# Patient Record
Sex: FEMALE | Race: WHITE | NOT HISPANIC OR LATINO | Employment: UNEMPLOYED | URBAN - NONMETROPOLITAN AREA
[De-identification: names, ages, dates, MRNs, and addresses within clinical notes are randomized per-mention and may not be internally consistent; named-entity substitution may affect disease eponyms.]

---

## 2017-02-28 ENCOUNTER — TELEPHONE (OUTPATIENT)
Dept: INTERNAL MEDICINE | Facility: CLINIC | Age: 42
End: 2017-02-28

## 2017-02-28 NOTE — TELEPHONE ENCOUNTER
----- Message from Ambar Baxter sent at 2/28/2017  3:42 PM EST -----  PATIENT CALLED AND WANTED TO TALK TO SOMEONE ABOUT HER HEALTH.     SHE HAS COME OFF ALL OF HER MEDICATION. SHE IS WANTING TO TAKE A NATURAL PATH WITH HER HEALTH CARE.     SHE IS ALSO NEEDING HER PET FOR HER EMOTIONAL SUPPORT AND NEEDS PAPERWORK FOR THAT.     SHE STATED THAT FOR THE LAST 2 YEARS SHE HAS BEEN WORKING ON HER DIET AND IS EATING ALMOST AN ALL NATURAL DIET.     I SCHEDULED HER FOR AN APPOINTMENT BUT SHE DIDN'T KNOW IF SHE WOULD NEED IT OR NOT. SHE DID WANT DR VERMEESCH TO ADVISE HER ON THESE THINGS. PLEASE CALL AND LET HER KNOW IF SHE NEEDS THAT APPOINTMENT.     [phone]

## 2017-02-28 NOTE — TELEPHONE ENCOUNTER
Left Pt detailed VM that she will need to be seen for this as Dr. Vermeesch hasn't seen her since 2015

## 2017-03-03 PROBLEM — F43.10 PTSD (POST-TRAUMATIC STRESS DISORDER): Status: ACTIVE | Noted: 2017-03-03

## 2018-02-06 ENCOUNTER — OFFICE VISIT (OUTPATIENT)
Dept: INTERNAL MEDICINE | Facility: CLINIC | Age: 43
End: 2018-02-06

## 2018-02-06 VITALS
SYSTOLIC BLOOD PRESSURE: 120 MMHG | TEMPERATURE: 98 F | DIASTOLIC BLOOD PRESSURE: 80 MMHG | OXYGEN SATURATION: 98 % | HEART RATE: 73 BPM

## 2018-02-06 DIAGNOSIS — F32.A ANXIETY AND DEPRESSION: Primary | ICD-10-CM

## 2018-02-06 DIAGNOSIS — F41.9 ANXIETY AND DEPRESSION: Primary | ICD-10-CM

## 2018-02-06 DIAGNOSIS — L65.9 HAIR LOSS: ICD-10-CM

## 2018-02-06 DIAGNOSIS — N30.00 ACUTE CYSTITIS WITHOUT HEMATURIA: ICD-10-CM

## 2018-02-06 LAB
BILIRUB BLD-MCNC: NEGATIVE MG/DL
CLARITY, POC: CLEAR
COLOR UR: YELLOW
GLUCOSE UR STRIP-MCNC: NEGATIVE MG/DL
KETONES UR QL: NEGATIVE
LEUKOCYTE EST, POC: ABNORMAL
NITRITE UR-MCNC: NEGATIVE MG/ML
PH UR: 6 [PH] (ref 5–8)
PROT UR STRIP-MCNC: NEGATIVE MG/DL
RBC # UR STRIP: ABNORMAL /UL
SP GR UR: 1.01 (ref 1–1.03)
T4 FREE SERPL-MCNC: 1.23 NG/DL (ref 0.78–2.19)
TSH SERPL DL<=0.005 MIU/L-ACNC: 0.86 MIU/ML (ref 0.47–4.68)
UROBILINOGEN UR QL: NORMAL

## 2018-02-06 PROCEDURE — 81003 URINALYSIS AUTO W/O SCOPE: CPT | Performed by: INTERNAL MEDICINE

## 2018-02-06 PROCEDURE — 99214 OFFICE O/P EST MOD 30 MIN: CPT | Performed by: INTERNAL MEDICINE

## 2018-02-06 RX ORDER — HYDROXYZINE HYDROCHLORIDE 25 MG/1
TABLET, FILM COATED ORAL
COMMUNITY
Start: 2014-08-11

## 2018-02-06 RX ORDER — SULFAMETHOXAZOLE AND TRIMETHOPRIM 800; 160 MG/1; MG/1
1 TABLET ORAL 2 TIMES DAILY
Qty: 14 TABLET | Refills: 0 | Status: SHIPPED | OUTPATIENT
Start: 2018-02-06 | End: 2018-03-16

## 2018-02-06 RX ORDER — CLONAZEPAM 0.5 MG/1
0.5 TABLET ORAL 2 TIMES DAILY PRN
Qty: 60 TABLET | Refills: 0 | Status: SHIPPED | OUTPATIENT
Start: 2018-02-06 | End: 2018-05-21 | Stop reason: SDUPTHER

## 2018-02-06 RX ORDER — RISPERIDONE 0.25 MG/1
TABLET ORAL
COMMUNITY
Start: 2015-04-08 | End: 2018-03-16 | Stop reason: SDUPTHER
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
## 2018-02-07 ENCOUNTER — TELEPHONE (OUTPATIENT)
Dept: INTERNAL MEDICINE | Facility: CLINIC | Age: 43
End: 2018-02-07

## 2018-02-07 NOTE — TELEPHONE ENCOUNTER
----- Message from Marilyn K Vermeesch, MD sent at 2/6/2018  4:46 PM EST -----  Please tell patient her thyroid labs are in normal range.

## 2018-02-08 LAB
BACTERIA UR CULT: ABNORMAL
BACTERIA UR CULT: ABNORMAL
OTHER ANTIBIOTIC SUSC ISLT: ABNORMAL

## 2018-02-21 ENCOUNTER — TELEPHONE (OUTPATIENT)
Dept: INTERNAL MEDICINE | Facility: CLINIC | Age: 43
End: 2018-02-21

## 2018-02-21 NOTE — TELEPHONE ENCOUNTER
"I called patient to discuss a referral. While we were discussing this, she asked if her thyroid results were \"optimal\". She states she did receive the  advising her that the results were normal, however, she is requesting a call back to let her know how normal. Thank you   "

## 2018-03-16 ENCOUNTER — OFFICE VISIT (OUTPATIENT)
Dept: INTERNAL MEDICINE | Facility: CLINIC | Age: 43
End: 2018-03-16

## 2018-03-16 VITALS
TEMPERATURE: 98 F | WEIGHT: 154 LBS | DIASTOLIC BLOOD PRESSURE: 76 MMHG | BODY MASS INDEX: 29.1 KG/M2 | OXYGEN SATURATION: 98 % | HEART RATE: 98 BPM | SYSTOLIC BLOOD PRESSURE: 124 MMHG

## 2018-03-16 DIAGNOSIS — F43.10 PTSD (POST-TRAUMATIC STRESS DISORDER): ICD-10-CM

## 2018-03-16 DIAGNOSIS — F41.9 ANXIETY AND DEPRESSION: Primary | ICD-10-CM

## 2018-03-16 DIAGNOSIS — F32.A ANXIETY AND DEPRESSION: Primary | ICD-10-CM

## 2018-03-16 PROBLEM — N30.00 ACUTE CYSTITIS WITHOUT HEMATURIA: Status: RESOLVED | Noted: 2018-02-06 | Resolved: 2018-03-16

## 2018-03-16 PROCEDURE — 99213 OFFICE O/P EST LOW 20 MIN: CPT | Performed by: INTERNAL MEDICINE

## 2018-03-16 RX ORDER — RISPERIDONE 0.25 MG/1
0.25 TABLET ORAL 2 TIMES DAILY PRN
Qty: 60 TABLET | Refills: 2 | Status: SHIPPED | OUTPATIENT
Start: 2018-03-16 | End: 2018-05-21 | Stop reason: SDUPTHER

## 2018-03-16 RX ORDER — LAMOTRIGINE 25 MG/1
25 TABLET ORAL 2 TIMES DAILY
Qty: 60 TABLET | Refills: 1 | Status: SHIPPED | OUTPATIENT
Start: 2018-03-16 | End: 2018-05-21

## 2018-03-16 NOTE — PROGRESS NOTES
Chief Complaint   Patient presents with   • Follow-up     4 weeks for anxiety. Pt states Clonazepam doesn't help her. Pt states she has some trouble sleeping.     Subjective   Yarelis Weiss is a 42 y.o. female.     Pt here for follow up of anxiety.  Last visit she was given klonapin to help with anxiety.  She states this is not helping her.  Has been taking about 1 tab or 1.5 tabs a day.  She did not want to take a daily medication, just something prn. She states she takes meds about 3 days and then it no longer works.    Her  has gabapentin and she took some of his and states it was helpful.  She took it during the day and states it pulls a weight off of her she is relaxed and she can feel normal.   She has not been sleeping well.  Currently does not eat well.    She was also given order for counseling.  She will make appt with Dr Padilla.         The following portions of the patient's history were reviewed and updated as appropriate: allergies, current medications, past family history, past medical history, past social history, past surgical history and problem list.    Review of Systems   Constitutional:        Poor appetite   Psychiatric/Behavioral: Positive for agitation, dysphoric mood and sleep disturbance. Negative for suicidal ideas. The patient is nervous/anxious.    All other systems reviewed and are negative.      Objective   /76   Pulse 98   Temp 98 °F (36.7 °C)   Wt 69.9 kg (154 lb)   SpO2 98%   BMI 29.10 kg/m²   Body mass index is 29.1 kg/m².  Physical Exam   Constitutional: She is oriented to person, place, and time. She appears well-developed and well-nourished.   Pleasant female, comes to clinic with her support dog   HENT:   Head: Normocephalic and atraumatic.   Eyes: Pupils are equal, round, and reactive to light.   Pulmonary/Chest: Effort normal.   Neurological: She is alert and oriented to person, place, and time.   Psychiatric: Her behavior is normal. Judgment and thought  content normal.   Sad mood, tearful at times, anxious   Nursing note and vitals reviewed.      Assessment/Plan   Yarelis Mortonmaryan is here today and the following problems have been addressed:      Yarelis was seen today for follow-up.    Diagnoses and all orders for this visit:    Anxiety and depression    PTSD (post-traumatic stress disorder)    Other orders  -     lamoTRIgine (LaMICtal) 25 MG tablet; Take 1 tablet by mouth 2 (Two) Times a Day.  -     risperiDONE (risperDAL) 0.25 MG tablet; Take 1 tablet by mouth 2 (Two) Times a Day As Needed (anxiety).    given lamictal 25 mg BID-take 25 mg daily at bedtime for 1 week, then increase to twice a day dosing   Also given risperdal .25 mg BID prn for sleep/anxiety  She will make appt with Dr Paidlla  Encouragement and reassurance given to patient    RTC 6 weeks    Please note that portions of this note were completed with a voice recognition program.  Efforts were made to edit dictation, but occasionally words are mistranscribed.

## 2018-05-21 ENCOUNTER — OFFICE VISIT (OUTPATIENT)
Dept: INTERNAL MEDICINE | Facility: CLINIC | Age: 43
End: 2018-05-21

## 2018-05-21 ENCOUNTER — TELEPHONE (OUTPATIENT)
Dept: INTERNAL MEDICINE | Facility: CLINIC | Age: 43
End: 2018-05-21

## 2018-05-21 VITALS
HEART RATE: 98 BPM | WEIGHT: 166 LBS | BODY MASS INDEX: 31.37 KG/M2 | SYSTOLIC BLOOD PRESSURE: 134 MMHG | OXYGEN SATURATION: 98 % | DIASTOLIC BLOOD PRESSURE: 72 MMHG | TEMPERATURE: 97.4 F

## 2018-05-21 DIAGNOSIS — F43.10 PTSD (POST-TRAUMATIC STRESS DISORDER): Primary | ICD-10-CM

## 2018-05-21 DIAGNOSIS — F32.A ANXIETY AND DEPRESSION: ICD-10-CM

## 2018-05-21 DIAGNOSIS — F41.9 ANXIETY AND DEPRESSION: ICD-10-CM

## 2018-05-21 PROCEDURE — 99214 OFFICE O/P EST MOD 30 MIN: CPT | Performed by: INTERNAL MEDICINE

## 2018-05-21 RX ORDER — RISPERIDONE 0.25 MG/1
0.25 TABLET ORAL 3 TIMES DAILY PRN
Qty: 90 TABLET | Refills: 5 | Status: SHIPPED | OUTPATIENT
Start: 2018-05-21

## 2018-05-21 RX ORDER — LAMOTRIGINE 50 MG/1
50 TABLET, ORALLY DISINTEGRATING ORAL 2 TIMES DAILY
Qty: 60 TABLET | Refills: 5 | Status: SHIPPED | OUTPATIENT
Start: 2018-05-21 | End: 2018-05-22 | Stop reason: CLARIF

## 2018-05-21 RX ORDER — BUSPIRONE HYDROCHLORIDE 30 MG/1
30 TABLET ORAL 3 TIMES DAILY PRN
Qty: 30 TABLET | Refills: 3 | Status: SHIPPED | OUTPATIENT
Start: 2018-05-21

## 2018-05-21 RX ORDER — CLONAZEPAM 1 MG/1
TABLET ORAL
Qty: 45 TABLET | Refills: 2 | Status: SHIPPED | OUTPATIENT
Start: 2018-05-21
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
## 2018-05-22 RX ORDER — LAMOTRIGINE 100 MG/1
50 TABLET ORAL 2 TIMES DAILY
Qty: 30 TABLET | Refills: 5 | Status: SHIPPED | OUTPATIENT
Start: 2018-05-22

## 2018-05-22 NOTE — TELEPHONE ENCOUNTER
You may call in Lamictal 50 mg tablet twice a day if available, if not, call in 100 mg tablet, one half tablet a.m. and p.m.  Please call in 1 month supply with 5 refills.

## 2018-12-08 NOTE — TELEPHONE ENCOUNTER
Last visit in office was 05/21/18. I also do not see a LLOYD or SVITLANAS on file. Ran the LLOYD. Pt will need an appt for refills correct?

## 2018-12-10 RX ORDER — CLONAZEPAM 1 MG/1
TABLET ORAL
Qty: 45 TABLET | Refills: 2 | OUTPATIENT
Start: 2018-12-10